# Patient Record
Sex: FEMALE | Race: WHITE | Employment: UNEMPLOYED | URBAN - METROPOLITAN AREA
[De-identification: names, ages, dates, MRNs, and addresses within clinical notes are randomized per-mention and may not be internally consistent; named-entity substitution may affect disease eponyms.]

---

## 2019-03-22 ENCOUNTER — OFFICE VISIT (OUTPATIENT)
Dept: OBGYN CLINIC | Age: 24
End: 2019-03-22

## 2019-03-22 VITALS
HEIGHT: 65 IN | WEIGHT: 134 LBS | SYSTOLIC BLOOD PRESSURE: 118 MMHG | DIASTOLIC BLOOD PRESSURE: 76 MMHG | BODY MASS INDEX: 22.33 KG/M2

## 2019-03-22 DIAGNOSIS — Z11.3 SCREENING EXAMINATION FOR STD (SEXUALLY TRANSMITTED DISEASE): ICD-10-CM

## 2019-03-22 DIAGNOSIS — Z01.419 WELL WOMAN EXAM WITH ROUTINE GYNECOLOGICAL EXAM: Primary | ICD-10-CM

## 2019-03-22 RX ORDER — TRAZODONE HYDROCHLORIDE 50 MG/1
50 TABLET ORAL
COMMUNITY
End: 2022-08-19 | Stop reason: ALTCHOICE

## 2019-03-22 RX ORDER — DULOXETIN HYDROCHLORIDE 30 MG/1
CAPSULE, DELAYED RELEASE ORAL
Refills: 1 | COMMUNITY
Start: 2019-02-27 | End: 2020-10-27

## 2019-03-22 RX ORDER — VALACYCLOVIR HYDROCHLORIDE 1 G/1
TABLET, FILM COATED ORAL
Refills: 3 | COMMUNITY
Start: 2019-02-20 | End: 2022-08-19 | Stop reason: ALTCHOICE

## 2019-03-22 RX ORDER — DICLOFENAC SODIUM 75 MG/1
TABLET, DELAYED RELEASE ORAL
COMMUNITY
End: 2022-08-19 | Stop reason: ALTCHOICE

## 2019-03-22 RX ORDER — DROSPIRENONE AND ETHINYL ESTRADIOL 0.03MG-3MG
KIT ORAL
Refills: 0 | COMMUNITY
Start: 2019-03-08 | End: 2020-10-27

## 2019-03-22 RX ORDER — NORETHINDRONE ACETATE AND ETHINYL ESTRADIOL, AND FERROUS FUMARATE 1MG-20(24)
1 KIT ORAL DAILY
Qty: 1 PACKAGE | Refills: 12 | Status: SHIPPED | OUTPATIENT
Start: 2019-03-22 | End: 2020-10-27

## 2019-03-22 NOTE — PROGRESS NOTES
Annual exam ages 21-44    Silvana Heaton is a No obstetric history on file. ,  26 y.o. female [facility] Patient's last menstrual period was 03/15/2019. .    She presents for her annual checkup. She is having irregular menstrual cycles. Would like to discuss alternate birth control. .  Currently on Marlyn; on this pill for approx 2.5years. Concerned that ocp makes her ty and anxious  Menarche age 17; initial miserable dysmenorrhea; now much improved  With regard to the Gardasil vaccine, she has received all 3 injections. Menstrual status:    Her periods are moderate in flow. She is using three to five pads or tampons per day, irregular. She denies dysmenorrhea. She reports no premenstrual symptoms. Contraception:    The current method of family planning is OCP (estrogen/progesterone). Sexual history:     She  reports that she currently engages in sexual activity and has had partners who are Male. She reports using the following method of birth control/protection: Pill. .    Medical conditions:    She has never had a GYN exam.     Pap and Mammogram History:    She has never had a pap smear. Breast Cancer History/Substance Abuse: negative    Past Medical History:   Diagnosis Date    Kirti-Danlos syndrome     MRSA (methicillin resistant staph aureus) culture positive     Musculoskeletal disorder     joint pain    Other ill-defined conditions(667.61)     multiple joint problems    Staph skin infection      Past Surgical History:   Procedure Laterality Date    HX HEENT      root canal    HX ORTHOPAEDIC Left 2013    Labrum repair, hip       Current Outpatient Medications   Medication Sig Dispense Refill    DULoxetine (CYMBALTA) 30 mg capsule TAKE 1 CAPSULE BY MOUTH EVERY DAY  1    drospirenone-ethinyl estradiol (MARLYN) 3-0.03 mg tab TAKE 1 TABLET BY MOUTH EVERY DAY  0    traZODone (DESYREL) 50 mg tablet Take 50 mg by mouth.       valACYclovir (VALTREX) 1 gram tablet PLEASE SEE ATTACHED FOR DETAILED DIRECTIONS  3    diclofenac EC (VOLTAREN) 75 mg EC tablet Take  by mouth.  cyclobenzaprine (FLEXERIL) 10 mg tablet Take 10 mg by mouth nightly.  mupirocin calcium (BACTROBAN) 2 % topical cream Apply  to affected area two (2) times a day.  amoxicillin (AMOXIL) 875 mg tablet Take 875 mg by mouth two (2) times a day.  mupirocin (BACTROBAN) 2 % ointment Apply  to affected area two (2) times a day. 22 g 0    acetaminophen-codeine (TYLENOL #3) 300-30 mg per tablet Take 1-2 Tabs by mouth every four (4) hours as needed for Pain. 60 Tab 0    diazepam (VALIUM) 2 mg tablet Take 1 Tab by mouth every six (6) hours as needed. 20 Tab 0    senna (SENOKOT) 8.6 mg tablet Take 1 Tab by mouth nightly. 30 Tab 0    acetaminophen (TYLENOL ARTHRITIS PAIN) 650 mg CR tablet Take 650 mg by mouth three (3) times daily as needed. Indications: PAIN      indomethacin SR (INDOCIN SR) 75 mg SR capsule Take 75 mg by mouth two (2) times a day.  diphenhydrAMINE (BENADRYL) 25 mg capsule Take 50 mg by mouth every six (6) hours as needed.  ibuprofen (MOTRIN) 200 mg tablet Take 400 mg by mouth every six (6) hours as needed. Allergies: Amoxicillin; Percocet [oxycodone-acetaminophen]; and Sulfa (sulfonamide antibiotics)     Tobacco History:  reports that she has quit smoking. She uses smokeless tobacco.  Alcohol Abuse:  reports that she drinks alcohol. Drug Abuse:  reports that she does not use drugs.     Family Medical/Cancer History:   Family History   Problem Relation Age of Onset    Diabetes Father         Review of Systems - History obtained from the patient  Constitutional: negative for weight loss, fever, night sweats  HEENT: negative for hearing loss, earache, congestion, snoring, sorethroat  CV: negative for chest pain, palpitations, edema  Resp: negative for cough, shortness of breath, wheezing  GI: negative for change in bowel habits, abdominal pain, black or bloody stools  : negative for frequency, dysuria, hematuria, vaginal discharge  MSK: negative for back pain, joint pain, muscle pain  Breast: negative for breast lumps, nipple discharge, galactorrhea  Skin :negative for itching, rash, hives  Neuro: negative for dizziness, headache, confusion, weakness  Psych: negative for anxiety, depression, change in mood  Heme/lymph: negative for bleeding, bruising, pallor    Physical Exam    Visit Vitals  Ht 5' 5\" (1.651 m)   Wt 134 lb (60.8 kg)   LMP 03/15/2019   BMI 22.30 kg/m²       Constitutional  · Appearance: well-nourished, well developed, alert, in no acute distress    HENT  · Head and Face: appears normal    Chest  · Respiratory Effort: breathing unlabored    Breasts  · Inspection of Breasts: breasts symmetrical, no skin changes, no discharge present, nipple appearance normal, no skin retraction present  · Palpation of Breasts and Axillae: no masses present on palpation, no breast tenderness  · Axillary Lymph Nodes: no lymphadenopathy present    Gastrointestinal  · Abdominal Examination: abdomen non-tender to palpation, normal bowel sounds, no masses present  · Liver and spleen: no hepatomegaly present, spleen not palpable  · Hernias: no hernias identified    Genitourinary  · External Genitalia: normal appearance for age, no discharge present, no tenderness present, no inflammatory lesions present, no masses present, no atrophy present  · Vagina: normal vaginal vault without central or paravaginal defects, retained tampon, no discharge present, no inflammatory lesions present, no masses present  · Bladder: non-tender to palpation  · Urethra: appears normal  · Cervix: normal   · Uterus: normal size, shape and consistency  · Adnexa: no adnexal tenderness present, no adnexal masses present  · Perineum: perineum within normal limits, no evidence of trauma, no rashes or skin lesions present  · Anus: anus within normal limits, no hemorrhoids present  · Inguinal Lymph Nodes: no lymphadenopathy present    Skin  · General Inspection: no rash, no lesions identified    Neurologic/Psychiatric  · Mental Status:  · Orientation: grossly oriented to person, place and time  · Mood and Affect: mood normal, affect appropriate    . Assessment:  Routine gynecologic examination  Her current medical status is satisfactory with no evidence of significant gynecologic issues.   STD screening  Retained tampon    Plan:  Tampon removed; precautions   STD screening  taytulla script shared  GC/ chlamydia/trich offered and desired w pap  Pap done today  Counseled re: diet, exercise, healthy lifestyle  Return for yearly wellness visits  Gardisil counseling provided  Rec screening mammo at either 37 or 42

## 2019-03-26 LAB
COMMENT, 144067: NORMAL
HBV SURFACE AG SERPL QL IA: NEGATIVE
HCV AB S/CO SERPL IA: <0.1 S/CO RATIO (ref 0–0.9)
HIV 1+2 AB+HIV1 P24 AG SERPL QL IA: NON REACTIVE
HSV1 IGG SER IA-ACNC: 2.47 INDEX (ref 0–0.9)
HSV1 IGM TITR SER IF: ABNORMAL TITER
HSV2 IGG SER IA-ACNC: <0.91 INDEX (ref 0–0.9)
HSV2 IGM TITR SER IF: ABNORMAL TITER
RPR SER QL: NON REACTIVE

## 2019-03-27 LAB
C TRACH RRNA CVX QL NAA+PROBE: NEGATIVE
CYTOLOGIST CVX/VAG CYTO: NORMAL
CYTOLOGY CVX/VAG DOC THIN PREP: NORMAL
DX ICD CODE: NORMAL
LABCORP, 190119: NORMAL
Lab: NORMAL
N GONORRHOEA RRNA CVX QL NAA+PROBE: NEGATIVE
OTHER STN SPEC: NORMAL
PATH REPORT.FINAL DX SPEC: NORMAL
STAT OF ADQ CVX/VAG CYTO-IMP: NORMAL
T VAGINALIS RRNA SPEC QL NAA+PROBE: NEGATIVE

## 2019-04-03 NOTE — PROGRESS NOTES
Called and spoke with patient advising of results and recommendations. Patient verbalized understanding.

## 2019-07-18 ENCOUNTER — TELEPHONE (OUTPATIENT)
Dept: OBGYN CLINIC | Age: 24
End: 2019-07-18

## 2019-07-18 RX ORDER — DROSPIRENONE AND ETHINYL ESTRADIOL 0.03MG-3MG
1 KIT ORAL DAILY
Qty: 3 PACKAGE | Refills: 3 | Status: SHIPPED | OUTPATIENT
Start: 2019-07-18 | End: 2020-07-02 | Stop reason: SDUPTHER

## 2019-07-18 NOTE — TELEPHONE ENCOUNTER
Patient of [address] [place] [address]. She dislikes using Taytula and requests to go back to Marlyn 3-0.03 mg tab . She had less side effects and the acne is severe on Taytula. Wants to take them continuous.       [facility]        Last annual:  3/22/19  Next annual due 3/22/20

## 2020-07-02 ENCOUNTER — TELEPHONE (OUTPATIENT)
Dept: OBGYN CLINIC | Age: 25
End: 2020-07-02

## 2020-07-02 RX ORDER — DROSPIRENONE AND ETHINYL ESTRADIOL 0.03MG-3MG
1 KIT ORAL DAILY
Qty: 3 PACKAGE | Refills: 0 | Status: SHIPPED | OUTPATIENT
Start: 2020-07-02 | End: 2020-09-17 | Stop reason: CLARIF

## 2020-07-02 NOTE — TELEPHONE ENCOUNTER
Received refill request from [facility] for pt's birth control pills. TC to pt informing her we can send a refill for a few months but she needs to schedule an annual exam before additional refills can be authorized.

## 2020-09-16 ENCOUNTER — TELEPHONE (OUTPATIENT)
Dept: OBGYN CLINIC | Age: 25
End: 2020-09-16

## 2020-09-16 NOTE — TELEPHONE ENCOUNTER
Message left at 1:01PM      23year old patient last seen in the office on 3/22/2019 and has next appointment on 10/27/2020    Patient left a message requesting a refill on her prescription for a refill on her ocp    This nurse attempted to reach the patient to confirm the name of the medication and pharmacy. Message left to call the office back.

## 2020-09-16 NOTE — TELEPHONE ENCOUNTER
This nurse attempted to reach the patient to confirm information and left a message for the patient to call the office back.

## 2020-09-17 RX ORDER — DROSPIRENONE AND ETHINYL ESTRADIOL 0.03MG-3MG
1 KIT ORAL DAILY
Qty: 1 PACKAGE | Refills: 0 | Status: SHIPPED | OUTPATIENT
Start: 2020-09-17 | End: 2020-10-21

## 2020-09-17 NOTE — TELEPHONE ENCOUNTER
This nurse attempted to reach the patient and left a detailed message for the patient to call the office back to provide further information regarding her request for a refill.

## 2020-09-17 NOTE — TELEPHONE ENCOUNTER
Patient called back at 8:34am    Medication requested for refill confirmed and pharmacy to have there refill sent to confirmed      Prescription for drospirenone-ethinyl estradioL (Lam Matt) 3-0.03 mg tab refill    sent as per MD order to get patient to her scheduled appointment. Patient advised of need to keep appointment in order to get further refills. Patient verbalized understanding.

## 2020-10-21 RX ORDER — DROSPIRENONE AND ETHINYL ESTRADIOL 0.03MG-3MG
KIT ORAL
Qty: 28 TAB | Refills: 0 | Status: SHIPPED | OUTPATIENT
Start: 2020-10-21 | End: 2020-10-27 | Stop reason: SDUPTHER

## 2020-10-21 NOTE — TELEPHONE ENCOUNTER
23year old patient last seen in the office on 3/22/2019 and has next appointment on 10/27/2020      Prescription refill sent as per Md order to get patient to her scheduled appointment.

## 2020-10-27 ENCOUNTER — OFFICE VISIT (OUTPATIENT)
Dept: OBGYN CLINIC | Age: 25
End: 2020-10-27
Payer: COMMERCIAL

## 2020-10-27 VITALS
HEIGHT: 65 IN | BODY MASS INDEX: 22.33 KG/M2 | DIASTOLIC BLOOD PRESSURE: 63 MMHG | SYSTOLIC BLOOD PRESSURE: 100 MMHG | WEIGHT: 134 LBS

## 2020-10-27 DIAGNOSIS — Z01.419 ENCOUNTER FOR GYNECOLOGICAL EXAMINATION WITHOUT ABNORMAL FINDING: Primary | ICD-10-CM

## 2020-10-27 PROCEDURE — 99395 PREV VISIT EST AGE 18-39: CPT | Performed by: OBSTETRICS & GYNECOLOGY

## 2020-10-27 RX ORDER — SPIRONOLACTONE 50 MG/1
TABLET, FILM COATED ORAL
COMMUNITY
Start: 2020-09-29

## 2020-10-27 RX ORDER — DROSPIRENONE AND ETHINYL ESTRADIOL 0.03MG-3MG
KIT ORAL
Qty: 84 TAB | Refills: 4 | Status: SHIPPED | OUTPATIENT
Start: 2020-10-27 | End: 2021-11-04

## 2020-10-27 RX ORDER — SERTRALINE HYDROCHLORIDE 50 MG/1
TABLET, FILM COATED ORAL
COMMUNITY
Start: 2020-10-09 | End: 2022-08-19 | Stop reason: ALTCHOICE

## 2020-10-27 RX ORDER — LAMOTRIGINE 200 MG/1
TABLET ORAL
COMMUNITY
Start: 2020-08-26 | End: 2022-08-19 | Stop reason: ALTCHOICE

## 2020-10-27 NOTE — PROGRESS NOTES
Annual exam ages 21-44    Kim Modi is a No obstetric history on file. ,  23 y.o. female [facility] Patient's last menstrual period was 10/23/2020. Collette Ruts She presents for her annual checkup. She is having no significant problems. Finishing at [address] [place] [address]; hoping to get into nursing school    With regard to the Gardasil vaccine, she n/a. Menstrual status:    Her periods are light, moderate in flow. She is using three to five pads or tampons per day, usually regular and last 26-30 days. She denies dysmenorrhea. She reports no premenstrual symptoms. Contraception:    The current method of family planning is OCP (estrogen/progesterone). Sexual history:     She  reports being sexually active and has had partner(s) who are Male. She reports using the following method of birth control/protection: Pill. .    Medical conditions:    Since her last annual GYN exam about two years ago, she has not the following changes in her health history: none. Pap and Mammogram History:    Her most recent Pap smear was normal, obtained 2 year(s) ago.       Breast Cancer History/Substance Abuse: negative    Past Medical History:   Diagnosis Date    Kirti-Danlos syndrome     Herpes simplex     MRSA (methicillin resistant staph aureus) culture positive     Musculoskeletal disorder     joint pain    Other ill-defined conditions(899.42)     multiple joint problems    Staph skin infection      Past Surgical History:   Procedure Laterality Date    HX HEENT      root canal    HX ORTHOPAEDIC Left 2013    Labrum repair, hip       Current Outpatient Medications   Medication Sig Dispense Refill    sertraline (ZOLOFT) 50 mg tablet TAKE 1 TABLET BY MOUTH EVERY DAY      lamoTRIgine (LaMICtal) 200 mg tablet TAKE 1 & 1/2 TABLETS BY MOUTH EVERY DAY      spironolactone (ALDACTONE) 50 mg tablet       DULoxetine (CYMBALTA) 30 mg capsule TAKE 1 CAPSULE BY MOUTH EVERY DAY  1    drospirenone-ethinyl estradiol (NICHOLAS) 3-0.03 mg tab TAKE 1 TABLET BY MOUTH EVERY DAY  0    cyclobenzaprine (FLEXERIL) 10 mg tablet Take 10 mg by mouth nightly.  drospirenone-ethinyl estradioL (NICHOLAS) 3-0.03 mg tab TAKE 1 TABLET BY MOUTH EVERY DAY 28 Tab 0    traZODone (DESYREL) 50 mg tablet Take 50 mg by mouth.  valACYclovir (VALTREX) 1 gram tablet PLEASE SEE ATTACHED FOR DETAILED DIRECTIONS  3    diclofenac EC (VOLTAREN) 75 mg EC tablet Take  by mouth.  norethindrone-e.estradiol-iron (TAYTULLA) 1 mg-20 mcg (24)/75 mg (4) cap Take 1 Tab by mouth daily. 1 Package 12     Allergies: Amoxicillin; Percocet [oxycodone-acetaminophen]; and Sulfa (sulfonamide antibiotics)     Tobacco History:  reports that she has quit smoking. She uses smokeless tobacco.  Alcohol Abuse:  reports current alcohol use. Drug Abuse:  reports no history of drug use.     Family Medical/Cancer History:   Family History   Problem Relation Age of Onset    Diabetes Father         Review of Systems - History obtained from the patient  Constitutional: negative for weight loss, fever, night sweats  HEENT: negative for hearing loss, earache, congestion, snoring, sorethroat  CV: negative for chest pain, palpitations, edema  Resp: negative for cough, shortness of breath, wheezing  GI: negative for change in bowel habits, abdominal pain, black or bloody stools  : negative for frequency, dysuria, hematuria, vaginal discharge  MSK: negative for back pain, joint pain, muscle pain  Breast: negative for breast lumps, nipple discharge, galactorrhea  Skin :negative for itching, rash, hives  Neuro: negative for dizziness, headache, confusion, weakness  Psych: negative for anxiety, depression, change in mood  Heme/lymph: negative for bleeding, bruising, pallor    Physical Exam    Visit Vitals  /63   Ht 5' 5\" (1.651 m)   Wt 134 lb (60.8 kg)   LMP 10/23/2020   BMI 22.30 kg/m²       Constitutional  · Appearance: well-nourished, well developed, alert, in no acute distress    HENT  · Head and Face: appears normal    Chest  · Respiratory Effort: breathing unlabored    Breasts  · Inspection of Breasts: breasts symmetrical, no skin changes, no discharge present, nipple appearance normal, no skin retraction present  · Palpation of Breasts and Axillae: no masses present on palpation, no breast tenderness  · Axillary Lymph Nodes: no lymphadenopathy present    Gastrointestinal  · Abdominal Examination: abdomen non-tender to palpation, normal bowel sounds, no masses present  · Liver and spleen: no hepatomegaly present, spleen not palpable  · Hernias: no hernias identified    Genitourinary  · External Genitalia: normal appearance for age, no discharge present, no tenderness present, no inflammatory lesions present, no masses present, no atrophy present  · Vagina: normal vaginal vault without central or paravaginal defects, bloody discharge present, no inflammatory lesions present, no masses present  · Bladder: non-tender to palpation  · Urethra: appears normal  · Cervix: normal   · Uterus: normal size, shape and consistency  · Adnexa: no adnexal tenderness present, no adnexal masses present  · Perineum: perineum within normal limits, no evidence of trauma, no rashes or skin lesions present  · Anus: anus within normal limits, no hemorrhoids present  · Inguinal Lymph Nodes: no lymphadenopathy present    Skin  · General Inspection: no rash, no lesions identified    Neurologic/Psychiatric  · Mental Status:  · Orientation: grossly oriented to person, place and time  · Mood and Affect: mood normal, affect appropriate    . Assessment:  Routine gynecologic examination  Her current medical status is satisfactory with no evidence of significant gynecologic issues. Plan:  GC/ chlamydia offered and declined  Patient offered and completed [facility] genetic screening questionnaire  and no changes in personal and family pertinent medical history.   Patient declines presence of chaperone during today's visit.    Pap not done today due to menses  Counseled re: diet, exercise, healthy lifestyle  Return for yearly wellness visits  Gardisil counseling provided  Rec screening mammo at either 37 or 42

## 2020-10-27 NOTE — PATIENT INSTRUCTIONS
Well Visit, Ages 25 to 48: Care Instructions  Your Care Instructions     Physical exams can help you stay healthy. Your doctor has checked your overall health and may have suggested ways to take good care of yourself. He or she also may have recommended tests. At home, you can help prevent illness with healthy eating, regular exercise, and other steps. Follow-up care is a key part of your treatment and safety. Be sure to make and go to all appointments, and call your doctor if you are having problems. It's also a good idea to know your test results and keep a list of the medicines you take. How can you care for yourself at home? · Reach and stay at a healthy weight. This will lower your risk for many problems, such as obesity, diabetes, heart disease, and high blood pressure. · Get at least 30 minutes of physical activity on most days of the week. Walking is a good choice. You also may want to do other activities, such as running, swimming, cycling, or playing tennis or team sports. Discuss any changes in your exercise program with your doctor. · Do not smoke or allow others to smoke around you. If you need help quitting, talk to your doctor about stop-smoking programs and medicines. These can increase your chances of quitting for good. · Talk to your doctor about whether you have any risk factors for sexually transmitted infections (STIs). Having one sex partner (who does not have STIs and does not have sex with anyone else) is a good way to avoid these infections. · Use birth control if you do not want to have children at this time. Talk with your doctor about the choices available and what might be best for you. · Protect your skin from too much sun. When you're outdoors from 10 a.m. to 4 p.m., stay in the shade or cover up with clothing and a hat with a wide brim. Wear sunglasses that block UV rays. Even when it's cloudy, put broad-spectrum sunscreen (SPF 30 or higher) on any exposed skin.   · See a dentist one or two times a year for checkups and to have your teeth cleaned. · Wear a seat belt in the car. Follow your doctor's advice about when to have certain tests. These tests can spot problems early. For everyone  · Cholesterol. Have the fat (cholesterol) in your blood tested after age 21. Your doctor will tell you how often to have this done based on your age, family history, or other things that can increase your risk for heart disease. · Blood pressure. Have your blood pressure checked during a routine doctor visit. Your doctor will tell you how often to check your blood pressure based on your age, your blood pressure results, and other factors. · Vision. Talk with your doctor about how often to have a glaucoma test.  · Diabetes. Ask your doctor whether you should have tests for diabetes. · Colon cancer. Your risk for colorectal cancer gets higher as you get older. Some experts say that adults should start regular screening at age 48 and stop at age 76. Others say to start before age 48 or continue after age 76. Talk with your doctor about your risk and when to start and stop screening. For women  · Breast exam and mammogram. Talk to your doctor about when you should have a clinical breast exam and a mammogram. Medical experts differ on whether and how often women under 50 should have these tests. Your doctor can help you decide what is right for you. · Cervical cancer screening test and pelvic exam. Begin with a Pap test at age 24. The test often is part of a pelvic exam. Starting at age 27, you may choose to have a Pap test, an HPV test, or both tests at the same time (called co-testing). Talk with your doctor about how often to have testing. · Tests for sexually transmitted infections (STIs). Ask whether you should have tests for STIs. You may be at risk if you have sex with more than one person, especially if your partners do not wear condoms.   For men  · Tests for sexually transmitted infections (STIs). Ask whether you should have tests for STIs. You may be at risk if you have sex with more than one person, especially if you do not wear a condom. · Testicular cancer exam. Ask your doctor whether you should check your testicles regularly. · Prostate exam. Talk to your doctor about whether you should have a blood test (called a PSA test) for prostate cancer. Experts differ on whether and when men should have this test. Some experts suggest it if you are older than 39 and are -American or have a father or brother who got prostate cancer when he was younger than 72. When should you call for help? Watch closely for changes in your health, and be sure to contact your doctor if you have any problems or symptoms that concern you. Where can you learn more? Go to http://www.vega.com/  Enter P072 in the search box to learn more about \"Well Visit, Ages 25 to 48: Care Instructions. \"  Current as of: May 27, 2020               Content Version: 12.6  © 2006-2020 [employer]. Care instructions adapted under license by [employer] (which disclaims liability or warranty for this information). If you have questions about a medical condition or this instruction, always ask your healthcare professional. [place] [address] any warranty or liability for your use of this information.

## 2021-11-04 RX ORDER — DROSPIRENONE AND ETHINYL ESTRADIOL 0.03MG-3MG
KIT ORAL
Qty: 84 TABLET | Refills: 4 | Status: SHIPPED | OUTPATIENT
Start: 2021-11-04 | End: 2022-08-19 | Stop reason: SDUPTHER

## 2022-08-19 ENCOUNTER — OFFICE VISIT (OUTPATIENT)
Dept: OBGYN CLINIC | Age: 27
End: 2022-08-19
Payer: COMMERCIAL

## 2022-08-19 VITALS — WEIGHT: 135 LBS | DIASTOLIC BLOOD PRESSURE: 80 MMHG | BODY MASS INDEX: 22.47 KG/M2 | SYSTOLIC BLOOD PRESSURE: 141 MMHG

## 2022-08-19 DIAGNOSIS — Z01.419 WELL WOMAN EXAM WITH ROUTINE GYNECOLOGICAL EXAM: Primary | ICD-10-CM

## 2022-08-19 PROCEDURE — 99395 PREV VISIT EST AGE 18-39: CPT | Performed by: OBSTETRICS & GYNECOLOGY

## 2022-08-19 RX ORDER — DROSPIRENONE AND ETHINYL ESTRADIOL 0.03MG-3MG
KIT ORAL
Qty: 84 TABLET | Refills: 4 | Status: SHIPPED | OUTPATIENT
Start: 2022-08-19

## 2022-08-19 NOTE — PROGRESS NOTES
Annual exam ages 21-44    Maddy Plata is a No obstetric history on file. ,  24 y.o. female [address] [facility] [place] Patient's last menstrual period was 07/27/2022 (exact date). .    She presents for her annual checkup. Patient reports her cycle is still erratic. Periods last for 3 days. Overall likes her ocp    She is having no significant problems. With regard to the Gardasil vaccine, she  unsure . Menstrual status:    Her periods are light in flow. She is using five to ten pads or tampons per day, usually regular and last 26-30 days. She denies dysmenorrhea. She reports no premenstrual symptoms. Contraception:    The current method of family planning is OCP (estrogen/progesterone). Sexual history:     She  reports being sexually active and has had partner(s) who are male. She reports using the following method of birth control/protection: Pill. .    Medical conditions:    Since her last annual GYN exam about two years ago, she has not the following changes in her health history: none. Pap and Mammogram History:    Her most recent Pap smear was normal, obtained 3 year(s) ago.       Breast Cancer History/Substance Abuse: negative    Past Medical History:   Diagnosis Date    Kirti-Danlos syndrome     Herpes simplex     MRSA (methicillin resistant staph aureus) culture positive     Musculoskeletal disorder     joint pain    Other ill-defined conditions(579.95)     multiple joint problems    Staph skin infection      Past Surgical History:   Procedure Laterality Date    HX HEENT      root canal    HX ORTHOPAEDIC Left 2013    Labrum repair, hip       Current Outpatient Medications   Medication Sig Dispense Refill    drospirenone-ethinyl estradioL (Sharon) 3-0.03 mg tab TAKE 1 TABLET BY MOUTH EVERY DAY 84 Tablet 4    spironolactone (ALDACTONE) 50 mg tablet       sertraline (ZOLOFT) 50 mg tablet TAKE 1 TABLET BY MOUTH EVERY DAY      lamoTRIgine (LaMICtal) 200 mg tablet TAKE 1 & 1/2 TABLETS BY MOUTH EVERY DAY      traZODone (DESYREL) 50 mg tablet Take 50 mg by mouth. valACYclovir (VALTREX) 1 gram tablet PLEASE SEE ATTACHED FOR DETAILED DIRECTIONS  3    diclofenac EC (VOLTAREN) 75 mg EC tablet Take  by mouth. cyclobenzaprine (FLEXERIL) 10 mg tablet Take 10 mg by mouth nightly. Allergies: Amoxicillin, Percocet [oxycodone-acetaminophen], and Sulfa (sulfonamide antibiotics)     Tobacco History:  reports that she has quit smoking. She uses smokeless tobacco.  Alcohol Abuse:  reports current alcohol use. Drug Abuse:  reports no history of drug use.     Family Medical/Cancer History:   Family History   Problem Relation Age of Onset    Diabetes Father         Review of Systems - History obtained from the patient  Constitutional: negative for weight loss, fever, night sweats  HEENT: negative for hearing loss, earache, congestion, snoring, sorethroat  CV: negative for chest pain, palpitations, edema  Resp: negative for cough, shortness of breath, wheezing  GI: negative for change in bowel habits, abdominal pain, black or bloody stools  : negative for frequency, dysuria, hematuria, vaginal discharge  MSK: negative for back pain, joint pain, muscle pain  Breast: negative for breast lumps, nipple discharge, galactorrhea  Skin :negative for itching, rash, hives  Neuro: negative for dizziness, headache, confusion, weakness  Psych: negative for anxiety, depression, change in mood  Heme/lymph: negative for bleeding, bruising, pallor    Physical Exam    Visit Vitals  BP (!) 141/80 Comment: White coat   Wt 135 lb (61.2 kg)   LMP 07/27/2022 (Exact Date)   BMI 22.47 kg/m²       Constitutional  Appearance: well-nourished, well developed, alert, in no acute distress    HENT  Head and Face: appears normal    Chest  Respiratory Effort: breathing unlabored    Breasts  Inspection of Breasts: breasts symmetrical, no skin changes, no discharge present, nipple appearance normal, no skin retraction present  Palpation of Breasts and Axillae: no masses present on palpation, no breast tenderness  Axillary Lymph Nodes: no lymphadenopathy present    Gastrointestinal  Abdominal Examination: abdomen non-tender to palpation, normal bowel sounds, no masses present  Liver and spleen: no hepatomegaly present, spleen not palpable  Hernias: no hernias identified    Genitourinary  External Genitalia: normal appearance for age, no discharge present, no tenderness present, no inflammatory lesions present, no masses present, no atrophy present  Vagina: normal vaginal vault without central or paravaginal defects, no discharge present, no inflammatory lesions present, no masses present  Bladder: non-tender to palpation  Urethra: appears normal  Cervix: normal   Uterus: normal size, shape and consistency  Adnexa: no adnexal tenderness present, no adnexal masses present  Perineum: perineum within normal limits, no evidence of trauma, no rashes or skin lesions present  Anus: anus within normal limits, no hemorrhoids present  Inguinal Lymph Nodes: no lymphadenopathy present    Skin  General Inspection: no rash, no lesions identified    Neurologic/Psychiatric  Mental Status:  Orientation: grossly oriented to person, place and time  Mood and Affect: mood normal, affect appropriate    . Assessment:  Routine gynecologic examination; on ocp  Her current medical status is satisfactory with no evidence of significant gynecologic issues. Plan:  GC/ chlamydia offered and declined   Script ocp  Patient declines presence of chaperone during today's visit.    Pap done today  Counseled re: diet, exercise, healthy lifestyle  Return for yearly wellness visits  Gardisil counseling provided  Rec screening mammo at either 37 or 42

## 2022-08-26 LAB
CYTOLOGIST CVX/VAG CYTO: NORMAL
CYTOLOGY CVX/VAG DOC CYTO: NORMAL
CYTOLOGY CVX/VAG DOC THIN PREP: NORMAL
DX ICD CODE: NORMAL
LABCORP, 190119: NORMAL
Lab: NORMAL
Lab: NORMAL
OTHER STN SPEC: NORMAL
STAT OF ADQ CVX/VAG CYTO-IMP: NORMAL

## 2023-10-23 ENCOUNTER — TELEPHONE (OUTPATIENT)
Age: 28
End: 2023-10-23

## 2023-10-23 RX ORDER — DROSPIRENONE AND ETHINYL ESTRADIOL 0.03MG-3MG
1 KIT ORAL DAILY
Qty: 1 PACKET | Refills: 3 | Status: SHIPPED | OUTPATIENT
Start: 2023-10-23

## 2023-11-08 ENCOUNTER — OFFICE VISIT (OUTPATIENT)
Age: 28
End: 2023-11-08
Payer: COMMERCIAL

## 2023-11-08 VITALS — DIASTOLIC BLOOD PRESSURE: 82 MMHG | WEIGHT: 134 LBS | SYSTOLIC BLOOD PRESSURE: 160 MMHG

## 2023-11-08 DIAGNOSIS — Z01.419 WELL WOMAN EXAM: Primary | ICD-10-CM

## 2023-11-08 PROCEDURE — 99395 PREV VISIT EST AGE 18-39: CPT | Performed by: OBSTETRICS & GYNECOLOGY

## 2023-11-08 RX ORDER — DROSPIRENONE AND ETHINYL ESTRADIOL 0.03MG-3MG
1 KIT ORAL DAILY
Qty: 3 PACKET | Refills: 3 | Status: SHIPPED | OUTPATIENT
Start: 2023-11-08

## 2023-11-11 LAB
., LABCORP: NORMAL
CYTOLOGIST CVX/VAG CYTO: NORMAL
CYTOLOGY CVX/VAG DOC CYTO: NORMAL
CYTOLOGY CVX/VAG DOC THIN PREP: NORMAL
DX ICD CODE: NORMAL
Lab: NORMAL
OTHER STN SPEC: NORMAL
STAT OF ADQ CVX/VAG CYTO-IMP: NORMAL

## 2024-11-18 RX ORDER — DROSPIRENONE AND ETHINYL ESTRADIOL 0.03MG-3MG
1 KIT ORAL DAILY
Qty: 28 TABLET | Refills: 0 | Status: SHIPPED | OUTPATIENT
Start: 2024-11-18

## 2024-12-16 ENCOUNTER — OFFICE VISIT (OUTPATIENT)
Age: 29
End: 2024-12-16
Payer: MEDICARE

## 2024-12-16 VITALS
DIASTOLIC BLOOD PRESSURE: 84 MMHG | WEIGHT: 147 LBS | SYSTOLIC BLOOD PRESSURE: 125 MMHG | BODY MASS INDEX: 24.49 KG/M2 | HEART RATE: 89 BPM | OXYGEN SATURATION: 98 % | HEIGHT: 65 IN

## 2024-12-16 DIAGNOSIS — Z01.419 WELL WOMAN EXAM: Primary | ICD-10-CM

## 2024-12-16 PROCEDURE — 99395 PREV VISIT EST AGE 18-39: CPT | Performed by: OBSTETRICS & GYNECOLOGY

## 2024-12-16 RX ORDER — MISOPROSTOL 200 UG/1
200 TABLET ORAL ONCE
Qty: 1 TABLET | Refills: 0 | Status: SHIPPED | OUTPATIENT
Start: 2024-12-16 | End: 2024-12-16

## 2024-12-16 RX ORDER — LORAZEPAM 1 MG/1
1 TABLET ORAL ONCE
Qty: 1 TABLET | Refills: 0 | Status: SHIPPED | OUTPATIENT
Start: 2024-12-16 | End: 2024-12-16

## 2024-12-16 NOTE — PATIENT INSTRUCTIONS
can also help check your pelvic organs.  You may have a small amount of vaginal discharge or bleeding after the exam.  Why is a pelvic exam done?  A pelvic exam may be done:  To collect samples of cells for cervical cancer screening.  To check for vaginal infection.  To check for sexually transmitted infections, such as chlamydia or herpes.  To help find the cause of abnormal uterine bleeding.  To look for problems like uterine fibroids, ovarian cysts, or uterine prolapse.  To help find the cause of pelvic or belly pain.  Before inserting an intrauterine device (IUD).  To collect evidence if you've been sexually assaulted.  What are the risks of a pelvic exam?  There is a small chance that the doctor will find something on a pelvic exam that would not have caused a problem. This is called overdiagnosis. It could lead to tests or treatment you don't need.  When should you call for help?  Watch closely for changes in your health, and be sure to contact your doctor if you have any problems.  Where can you learn more?  Go to https://www.[employer].net/patientEd and enter M421 to learn more about \"Pelvic Exam: Care Instructions.\"  Current as of: November 27, 2023  Content Version: 14.2  © 2024 [employer].   Care instructions adapted under license by [employer]. If you have questions about a medical condition or this instruction, always ask your healthcare professional. [employer] disclaims any warranty or liability for your use of this information.

## 2024-12-16 NOTE — PROGRESS NOTES
Lorraine Aden is a 29 y.o. female returns for an annual exam. Currently on Maribel ocps for birth control. Missed her last pack and hasn't had a cycle since 11/11. UPT in the office is negative. Has questions about getting IUD.    Chief Complaint   Patient presents with    Annual Exam               1. Have you been to the ER, urgent care clinic, or hospitalized since your last visit? No    2. Have you seen or consulted any other health care providers outside of the [facility] since your last visit? No    Sherice Srivastava LPN.  
galactorrhea  Skin :negative for itching, rash, hives  Neuro: negative for dizziness, headache, confusion, weakness  Psych: negative for anxiety, depression, change in mood  Heme/lymph: negative for bleeding, bruising, pallor    Physical Exam    /84 (Site: Right Upper Arm)   Pulse 89   Ht 1.651 m (5' 5\")   Wt 66.7 kg (147 lb)   LMP 11/11/2024 (Approximate)   SpO2 98%   BMI 24.46 kg/m²     Constitutional  Appearance: well-nourished, well developed, alert, in no acute distress    HENT  Head and Face: appears normal    Chest  Respiratory Effort: breathing unlabored    Breasts  Inspection of Breasts: breasts symmetrical, no skin changes, no discharge present, nipple appearance normal, no skin retraction present  Palpation of Breasts and Axillae: no masses present on palpation, no breast tenderness  Axillary Lymph Nodes: no lymphadenopathy present    Gastrointestinal  Abdominal Examination: abdomen non-tender to palpation, normal bowel sounds, no masses present  Liver and spleen: no hepatomegaly present, spleen not palpable  Hernias: no hernias identified    Genitourinary  External Genitalia: normal appearance for age, no discharge present, no tenderness present, no inflammatory lesions present, no masses present, no atrophy present  Vagina: normal vaginal vault without central or paravaginal defects, no discharge present, no inflammatory lesions present, no masses present  Bladder: non-tender to palpation  Urethra: appears normal  Cervix: normal   Uterus: normal size, shape and consistency  Adnexa: no adnexal tenderness present, no adnexal masses present  Perineum: perineum within normal limits, no evidence of trauma, no rashes or skin lesions present  Anus: anus within normal limits, no hemorrhoids present  Inguinal Lymph Nodes: no lymphadenopathy present    Skin  General Inspection: no rash, no lesions identified    Neurologic/Psychiatric  Mental Status:  Orientation: grossly oriented to person, place and

## 2024-12-20 ENCOUNTER — OFFICE VISIT (OUTPATIENT)
Age: 29
End: 2024-12-20
Payer: MEDICARE

## 2024-12-20 VITALS
WEIGHT: 149 LBS | SYSTOLIC BLOOD PRESSURE: 112 MMHG | BODY MASS INDEX: 24.83 KG/M2 | HEART RATE: 92 BPM | HEIGHT: 65 IN | DIASTOLIC BLOOD PRESSURE: 79 MMHG | OXYGEN SATURATION: 95 %

## 2024-12-20 DIAGNOSIS — Z30.430 ENCOUNTER FOR IUD INSERTION: Primary | ICD-10-CM

## 2024-12-20 PROCEDURE — 58300 INSERT INTRAUTERINE DEVICE: CPT | Performed by: OBSTETRICS & GYNECOLOGY

## 2024-12-20 PROCEDURE — 81002 URINALYSIS NONAUTO W/O SCOPE: CPT | Performed by: OBSTETRICS & GYNECOLOGY

## 2024-12-20 RX ORDER — LEVONORGESTREL 19.5 MG/1
1 INTRAUTERINE DEVICE INTRAUTERINE ONCE
COMMUNITY

## 2024-12-20 NOTE — PROGRESS NOTES
Kyleena IUD INSERTION  Indications:  Lorraine Aden is a ,  29 y.o. female White (non-) Patient's last menstrual period was 2024 (approximate).  Her LMP was normal in duration and amount of flow. She  presents for insertion of an IUD.    The risks, benefits and alternatives of IUD insertion were discussed in detail at last visit.  She also has reviewed appropriate IUD information. She has elected to proceed with the insertion today and she states she has no further questions. A urine pregnancy test was negative No results found for: \"SPEP\", \"UPEP\"  Procedure:  The pelvic exam revealed normal external genitalia. On bimanual exam the uterus was anteverted and normal in size with no tenderness present. A speculum was inserted into the vagina and the cervix was visualized. The cervix was prepped with a betadine solution. The anterior lip of the cervix was grasped with an Allis tenaculum. The uterus was sounded with a Sotomayor sound to 7.5centimeters. A Kyleena IUD was then inserted without difficulty. The string was cut to 3 centimeters.She experienced a minimal amount of cramping.   Post Procedure Status:     The patient was observed for 5 minutes after the insertion. There were no complications.  Patient was given postop instructions and instructed to check on IUD string in 1 to 2 months or to have IUD check here in the office.  Patient was discharged in stable condition.

## 2024-12-20 NOTE — PROGRESS NOTES
Lorraine Aden is a 29 y.o. female presents for a IUD visit.    Chief Complaint   Patient presents with    Contraception       Kyleena Insertion      Patient's last menstrual period was 11/11/2024 (approximate).    Birth Control: IUD.            1. Have you been to the ER, urgent care clinic, or hospitalized since your last visit? No    2. Have you seen or consulted any other health care providers outside of the [facility] since your last visit? No    Device number [id number], expiration date 9/2026    Chart reviewed for the following:   Sherice MAIN LPN, have reviewed the History, Physical and updated the Allergic reactions for Lorraine Aden     TIME OUT performed immediately prior to start of procedure:   Sherice MAIN LPN, have performed the following reviews on Lorraine Aden prior to the start of the procedure:            * Patient was identified by name and date of birth   * Agreement on procedure being performed was verified  * Risks and Benefits explained to the patient  * Procedure site verified and marked as necessary  * Patient was positioned for comfort  * Consent was signed and verified     Time: 10:50 am    Date of procedure: 12/20/2024    Procedure performed by:  Maureen Hughes MD       Provider assisted by: Sherice Srivastava LPN    Patient assisted by: self    How tolerated by patient: tolerated the procedure well with no complications    Post Procedural Pain Scale: 0 - No Hurt    Comments: none    Examination chaperoned by Sherice Srivastava LPN.